# Patient Record
Sex: FEMALE | Race: WHITE | NOT HISPANIC OR LATINO | URBAN - METROPOLITAN AREA
[De-identification: names, ages, dates, MRNs, and addresses within clinical notes are randomized per-mention and may not be internally consistent; named-entity substitution may affect disease eponyms.]

---

## 2024-01-08 ENCOUNTER — TELEPHONE (OUTPATIENT)
Dept: PRIMARY CARE | Facility: CLINIC | Age: 64
End: 2024-01-08
Payer: COMMERCIAL

## 2024-01-08 NOTE — TELEPHONE ENCOUNTER
Pt has an appointment 1/31/24 and would like an order for Bw before appt. Please call pt once order in chart and will mail to her home address.

## 2024-01-24 DIAGNOSIS — K21.9 GASTROESOPHAGEAL REFLUX DISEASE WITHOUT ESOPHAGITIS: ICD-10-CM

## 2024-01-24 DIAGNOSIS — Z00.00 WELL ADULT HEALTH CHECK: ICD-10-CM

## 2024-01-24 DIAGNOSIS — E78.5 HYPERLIPIDEMIA, UNSPECIFIED HYPERLIPIDEMIA TYPE: ICD-10-CM

## 2024-01-24 DIAGNOSIS — Z00.00 HEALTHCARE MAINTENANCE: Primary | ICD-10-CM

## 2024-01-31 ENCOUNTER — APPOINTMENT (OUTPATIENT)
Dept: PRIMARY CARE | Facility: CLINIC | Age: 64
End: 2024-01-31
Payer: COMMERCIAL

## 2024-02-02 ENCOUNTER — OFFICE VISIT (OUTPATIENT)
Dept: PRIMARY CARE | Facility: CLINIC | Age: 64
End: 2024-02-02
Payer: COMMERCIAL

## 2024-02-02 VITALS
TEMPERATURE: 96.4 F | HEIGHT: 64 IN | WEIGHT: 148.6 LBS | DIASTOLIC BLOOD PRESSURE: 91 MMHG | BODY MASS INDEX: 25.37 KG/M2 | HEART RATE: 58 BPM | SYSTOLIC BLOOD PRESSURE: 155 MMHG

## 2024-02-02 DIAGNOSIS — K21.9 GASTROESOPHAGEAL REFLUX DISEASE WITHOUT ESOPHAGITIS: ICD-10-CM

## 2024-02-02 DIAGNOSIS — G47.00 INSOMNIA, UNSPECIFIED TYPE: ICD-10-CM

## 2024-02-02 DIAGNOSIS — I10 PRIMARY HYPERTENSION: ICD-10-CM

## 2024-02-02 DIAGNOSIS — Z00.00 WELL ADULT HEALTH CHECK: ICD-10-CM

## 2024-02-02 DIAGNOSIS — E78.2 MIXED HYPERLIPIDEMIA: Primary | ICD-10-CM

## 2024-02-02 PROBLEM — J30.9 ALLERGIC RHINITIS: Status: ACTIVE | Noted: 2024-02-02

## 2024-02-02 PROBLEM — E78.5 DYSLIPIDEMIA: Status: ACTIVE | Noted: 2024-02-02

## 2024-02-02 PROBLEM — M19.079 PRIMARY LOCALIZED OSTEOARTHROSIS OF ANKLE AND FOOT: Status: ACTIVE | Noted: 2017-05-03

## 2024-02-02 PROBLEM — K57.92 DIVERTICULITIS: Status: ACTIVE | Noted: 2023-03-20

## 2024-02-02 PROBLEM — E66.3 OVERWEIGHT: Status: ACTIVE | Noted: 2024-02-02

## 2024-02-02 PROBLEM — E78.5 HYPERLIPIDEMIA: Status: ACTIVE | Noted: 2024-02-02

## 2024-02-02 PROBLEM — K82.4 GALL BLADDER POLYP: Status: ACTIVE | Noted: 2024-02-02

## 2024-02-02 PROBLEM — J34.89 RHINORRHEA: Status: ACTIVE | Noted: 2024-02-02

## 2024-02-02 PROBLEM — S39.012A STRAIN OF MUSCLE, FASCIA AND TENDON OF LOWER BACK, INITIAL ENCOUNTER: Status: ACTIVE | Noted: 2021-09-20

## 2024-02-02 PROBLEM — T84.84XA PAINFUL ORTHOPAEDIC HARDWARE (CMS-HCC): Status: ACTIVE | Noted: 2023-03-08

## 2024-02-02 PROBLEM — K57.30 DIVERTICULA, COLON: Status: ACTIVE | Noted: 2024-02-02

## 2024-02-02 PROBLEM — K58.9 IRRITABLE BOWEL SYNDROME: Status: ACTIVE | Noted: 2023-03-20

## 2024-02-02 PROBLEM — Z98.1 H/O ARTHRODESIS: Status: ACTIVE | Noted: 2023-03-08

## 2024-02-02 PROBLEM — R73.9 HYPERGLYCEMIA: Status: ACTIVE | Noted: 2024-02-02

## 2024-02-02 PROBLEM — M47.816 LUMBAR SPONDYLOSIS: Status: ACTIVE | Noted: 2021-09-20

## 2024-02-02 PROCEDURE — 99214 OFFICE O/P EST MOD 30 MIN: CPT | Performed by: INTERNAL MEDICINE

## 2024-02-02 PROCEDURE — 3080F DIAST BP >= 90 MM HG: CPT | Performed by: INTERNAL MEDICINE

## 2024-02-02 PROCEDURE — 3077F SYST BP >= 140 MM HG: CPT | Performed by: INTERNAL MEDICINE

## 2024-02-02 PROCEDURE — 1036F TOBACCO NON-USER: CPT | Performed by: INTERNAL MEDICINE

## 2024-02-02 RX ORDER — TRAZODONE HYDROCHLORIDE 150 MG/1
150 TABLET ORAL NIGHTLY
Qty: 90 TABLET | Refills: 1 | Status: SHIPPED | OUTPATIENT
Start: 2024-02-02

## 2024-02-02 RX ORDER — MELOXICAM 15 MG/1
15 TABLET ORAL DAILY
COMMUNITY
Start: 2023-07-13 | End: 2024-02-02 | Stop reason: ALTCHOICE

## 2024-02-02 RX ORDER — OMEPRAZOLE 40 MG/1
40 CAPSULE, DELAYED RELEASE ORAL
Qty: 90 CAPSULE | Refills: 1 | Status: SHIPPED | OUTPATIENT
Start: 2024-02-02

## 2024-02-02 RX ORDER — TRAZODONE HYDROCHLORIDE 150 MG/1
150 TABLET ORAL NIGHTLY
COMMUNITY
End: 2024-02-02 | Stop reason: SDUPTHER

## 2024-02-02 RX ORDER — GEMFIBROZIL 600 MG/1
600 TABLET, FILM COATED ORAL 2 TIMES DAILY
Qty: 180 TABLET | Refills: 1 | Status: SHIPPED | OUTPATIENT
Start: 2024-02-02

## 2024-02-02 RX ORDER — ROSUVASTATIN CALCIUM 20 MG/1
20 TABLET, COATED ORAL DAILY
COMMUNITY
Start: 2016-09-22 | End: 2024-02-02 | Stop reason: SDUPTHER

## 2024-02-02 RX ORDER — GEMFIBROZIL 600 MG/1
600 TABLET, FILM COATED ORAL DAILY
COMMUNITY
Start: 2018-04-27 | End: 2024-02-02 | Stop reason: SDUPTHER

## 2024-02-02 RX ORDER — OMEPRAZOLE 40 MG/1
40 CAPSULE, DELAYED RELEASE ORAL
COMMUNITY
Start: 2016-06-23 | End: 2024-02-02 | Stop reason: SDUPTHER

## 2024-02-02 RX ORDER — ROSUVASTATIN CALCIUM 20 MG/1
20 TABLET, COATED ORAL DAILY
Qty: 90 TABLET | Refills: 1 | Status: SHIPPED | OUTPATIENT
Start: 2024-02-02

## 2024-02-02 RX ORDER — ATENOLOL 25 MG/1
25 TABLET ORAL DAILY
Qty: 90 TABLET | Refills: 1 | Status: SHIPPED | OUTPATIENT
Start: 2024-02-02 | End: 2024-07-31

## 2024-02-02 ASSESSMENT — PATIENT HEALTH QUESTIONNAIRE - PHQ9
2. FEELING DOWN, DEPRESSED OR HOPELESS: NOT AT ALL
1. LITTLE INTEREST OR PLEASURE IN DOING THINGS: NOT AT ALL
SUM OF ALL RESPONSES TO PHQ9 QUESTIONS 1 AND 2: 0

## 2024-02-02 NOTE — PROGRESS NOTES
Assessment/Plan   Problem List Items Addressed This Visit       GERD (gastroesophageal reflux disease)    Relevant Medications    omeprazole (PriLOSEC) 40 mg DR capsule    Hyperlipidemia - Primary    Relevant Medications    gemfibrozil (Lopid) 600 mg tablet    rosuvastatin (Crestor) 20 mg tablet    Other Relevant Orders    Lipid Panel    CK    Insomnia    Relevant Medications    traZODone (Desyrel) 150 mg tablet    Primary hypertension    Relevant Medications    atenolol (Tenormin) 25 mg tablet     Other Visit Diagnoses       Well adult health check        Relevant Orders    Comprehensive Metabolic Panel          Mixed hyperlipidemia increase the dose of gemfibrozil   Benign essential hypertension start small dose of atenolol to see how he does   She had some bradycardia as well but she does have some anxiety status to going for EGD and colonoscopy I thought atenolol will be better suited for her  We will reevaluate in 3-month with the labs as ordered  Advised to cut down the diet with a low carbohydrate which will improve the triglyceride remarkably  She has history of gallbladder polyp we will continue to monitor  Subjective   Patient ID: Ana Paula Villalobos is a 63 y.o. female who presents for Follow-up (Blood work results.  Requesting refills on medications. ).    Past Surgical History:   Procedure Laterality Date    MOUTH SURGERY  01/30/2024    OTHER SURGICAL HISTORY  05/11/2022    Colonoscopy    OTHER SURGICAL HISTORY  05/11/2022    Median nerve neuroplasty    OTHER SURGICAL HISTORY  05/11/2022    Foot surgery      No family history on file.   Social History     Socioeconomic History    Marital status:      Spouse name: Not on file    Number of children: Not on file    Years of education: Not on file    Highest education level: Not on file   Occupational History    Not on file   Tobacco Use    Smoking status: Former     Packs/day: 1     Types: Cigarettes     Quit date: 2014     Years since quitting: 10.0  "   Smokeless tobacco: Never   Substance and Sexual Activity    Alcohol use: Yes    Drug use: Never    Sexual activity: Not on file   Other Topics Concern    Not on file   Social History Narrative    Not on file     Social Determinants of Health     Financial Resource Strain: Not on file   Food Insecurity: Not on file   Transportation Needs: Not on file   Physical Activity: Not on file   Stress: Not on file   Social Connections: Not on file   Intimate Partner Violence: Not on file   Housing Stability: Not on file      Patient has no known allergies.   Current Outpatient Medications   Medication Sig Dispense Refill    atenolol (Tenormin) 25 mg tablet Take 1 tablet (25 mg) by mouth once daily. 90 tablet 1    gemfibrozil (Lopid) 600 mg tablet Take 1 tablet (600 mg) by mouth 2 times a day. 180 tablet 1    omeprazole (PriLOSEC) 40 mg DR capsule Take 1 capsule (40 mg) by mouth once daily in the morning. Take before meals. 90 capsule 1    rosuvastatin (Crestor) 20 mg tablet Take 1 tablet (20 mg) by mouth once daily. 90 tablet 1    traZODone (Desyrel) 150 mg tablet Take 1 tablet (150 mg) by mouth once daily at bedtime. 90 tablet 1     No current facility-administered medications for this visit.      Vitals:    02/02/24 1026   BP: (!) 155/91   BP Location: Right arm   Patient Position: Sitting   Pulse: 58   Temp: 35.8 °C (96.4 °F)   Weight: 67.4 kg (148 lb 9.6 oz)   Height: 1.626 m (5' 4\")      Problem List Items Addressed This Visit       GERD (gastroesophageal reflux disease)    Relevant Medications    omeprazole (PriLOSEC) 40 mg DR capsule    Hyperlipidemia - Primary    Relevant Medications    gemfibrozil (Lopid) 600 mg tablet    rosuvastatin (Crestor) 20 mg tablet    Other Relevant Orders    Lipid Panel    CK    Insomnia    Relevant Medications    traZODone (Desyrel) 150 mg tablet    Primary hypertension    Relevant Medications    atenolol (Tenormin) 25 mg tablet     Other Visit Diagnoses       Well adult health check     " "   Relevant Orders    Comprehensive Metabolic Panel           Orders Placed This Encounter   Procedures    Comprehensive Metabolic Panel     Standing Status:   Future     Standing Expiration Date:   2/2/2025     Order Specific Question:   Release result to MyChart     Answer:   Immediate    Lipid Panel     Standing Status:   Future     Standing Expiration Date:   2/2/2025     Order Specific Question:   Release result to MyChart     Answer:   Immediate    CK     Standing Status:   Future     Standing Expiration Date:   2/2/2025     Order Specific Question:   Release result to MyChart     Answer:   Immediate [1]        HPI  This very pleasant 63-year-old female presented for review follow-up and refills  Her lab has shown evidence of uncontrolled mixed hyperlipidemia and was discussing whether we should increase the dose of gemfibrozil which was agreed upon  Nevertheless risk associated with combination of Crestor and gemfibrozil was discussed  She also mentioning that multiple times her blood pressure has been elevated  She is also going for EGD and colonoscopy soon  And she wanted medication for insomnia refilled    ROS otherwise negative  Past medical history reviewed  Social and family history reviewed  Allergies and medications reviewed  Recent labs reviewed  Vital signs reviewed    PHYSICAL EXAM  Cardiovascular chest abdominal neurological examination normal  Labs in the clinic system was reviewed shared and discussed      No results found for: \"PR1\", \"BMPR1A\", \"CMPLAS\", \"LD3YFGPY\", \"KPSAT\"   No results found for: \"CHOL\", \"LDLCALC\", \"HDLC\", \"LCTRG\", \"CHHDL\"             "

## 2024-05-20 ENCOUNTER — APPOINTMENT (OUTPATIENT)
Dept: PRIMARY CARE | Facility: CLINIC | Age: 64
End: 2024-05-20
Payer: COMMERCIAL

## 2025-02-20 ENCOUNTER — APPOINTMENT (OUTPATIENT)
Dept: RADIOLOGY | Facility: CLINIC | Age: 65
End: 2025-02-20
Payer: COMMERCIAL

## 2025-03-07 ENCOUNTER — HOSPITAL ENCOUNTER (OUTPATIENT)
Dept: RADIOLOGY | Facility: CLINIC | Age: 65
Discharge: HOME | End: 2025-03-07
Payer: COMMERCIAL

## 2025-03-07 DIAGNOSIS — R91.8 OTHER NONSPECIFIC ABNORMAL FINDING OF LUNG FIELD: ICD-10-CM

## 2025-03-07 PROCEDURE — 71250 CT THORAX DX C-: CPT

## 2025-03-19 ENCOUNTER — APPOINTMENT (OUTPATIENT)
Dept: CARDIOLOGY | Facility: CLINIC | Age: 65
End: 2025-03-19
Payer: COMMERCIAL

## 2025-03-19 VITALS
HEIGHT: 64 IN | SYSTOLIC BLOOD PRESSURE: 110 MMHG | BODY MASS INDEX: 23.56 KG/M2 | WEIGHT: 138 LBS | HEART RATE: 62 BPM | DIASTOLIC BLOOD PRESSURE: 62 MMHG

## 2025-03-19 DIAGNOSIS — E78.5 DYSLIPIDEMIA: ICD-10-CM

## 2025-03-19 DIAGNOSIS — I25.10 CORONARY ARTERY CALCIFICATION: Primary | ICD-10-CM

## 2025-03-19 PROCEDURE — 99204 OFFICE O/P NEW MOD 45 MIN: CPT | Performed by: INTERNAL MEDICINE

## 2025-03-19 PROCEDURE — 93000 ELECTROCARDIOGRAM COMPLETE: CPT | Performed by: INTERNAL MEDICINE

## 2025-03-19 PROCEDURE — 3078F DIAST BP <80 MM HG: CPT | Performed by: INTERNAL MEDICINE

## 2025-03-19 PROCEDURE — 3074F SYST BP LT 130 MM HG: CPT | Performed by: INTERNAL MEDICINE

## 2025-03-19 PROCEDURE — 3008F BODY MASS INDEX DOCD: CPT | Performed by: INTERNAL MEDICINE

## 2025-03-19 PROCEDURE — 1036F TOBACCO NON-USER: CPT | Performed by: INTERNAL MEDICINE

## 2025-03-19 RX ORDER — BISMUTH SUBSALICYLATE 262 MG
1 TABLET,CHEWABLE ORAL DAILY
COMMUNITY

## 2025-03-19 NOTE — PATIENT INSTRUCTIONS
"It was my pleasure to meet you.  I look forward to being your cardiologist.  I am a huge believer in communicating with my patients.  Please contact me at any time, if anything is not clear to you regarding anything we have discussed, or if new questions occur to you.     You should increase your intake of fresh fruits and vegetables.  Try to consume 9-12 servings per day of such foods.  You should increase your intake of deep sea fish such as salmon and tuna.  Try to get two servings per week of fish, but if you are a pregnant woman, talk to your obstetrician before increasing your fish intake.  You should increase your intake of unprocessed nuts such as walnuts or almonds.  Increase your intake of plant-based protein.  You should avoid fried foods.  Don't consume sugary or starchy foods and sugary drinks.  Avoid saturated fats.  Try not to dine at restaurants more than once per month, and don't dine at fast food places.  Try to get 7-9 hours of sleep every night.  Try to get 150 minutes per week of moderate intensity exercise (after I have cleared you to start an exercise program).  Try to maintain the appropriate weight for your height based on body mass index (BMI). Maintain your cholesterol, blood sugar, and blood pressure in the recommended respective normal ranges.  There is a wealth of information on the American Heart Association's website regarding this.  Just Google \"Life's Essential 8\" for more information.   Ask me about any of these details  if you have questions.    As your cardiologist, I will be available to you at any time to answer any question you have concerning your heart health.  My staff, Zelda can also answer any questions you may have.  Best of luck.       It is important for us to have an accurate list of the medications, supplements, and their doses.  It is also important for us to have an accurate list of your allergies.  Please bring this information to every appointment.  " This is a vital part of the quality of care you receive through all of your providers.

## 2025-03-19 NOTE — PROGRESS NOTES
Referred by Dr. Jones for Please see below.     History Of Present Illness:    Ana Paula Villalobos is a 64 y.o. female presenting with coronary artery calcification.    PCP: Dr. Coral Arce ([facility])    I am seeing this 64-year-old former 15-pack-year smoker with hyperlipidemia, hypertriglyceridemia, and a family history of premature CAD (mother sustained an MI in her 70s; father developed CAD in his 70s) for evaluation of coronary artery calcification.    The patient gets an annual thoracic CT scan for follow-up of pulmonary nodules because of her prior history of tobacco abuse.  Her thoracic CT scan in February 2025 commented on the presence of severe coronary artery calcifrication, for which reason the patient was advised to see me.  The patient has had episodic left sided chest discomfort that occurs from time to time.  It feels like her bra is a bit tight.  These symptms are not exertinal.   The patient denies dyspnea, palpitations, orthopnea, PND, syncope, and near syncope.    She rides a stationary bike four tiems a week for 18 minutes.  She enjoys playing street hockey with her grandchildren.  She feels well when she exercises.      In February 2024, she fell, and was admitted to [facility].  A thoracic CT was done which commented on mild CAC.  She was referred to a cardiologist at the [facility].  She went on to have a stress test in February 2024 which was nondiagnostic due to a blunted heart rate response secondary to atenolol at a peak functional capacity of 8.3 METS.  Her PCP ordered a monitor  for bradycardia, done at [facility].  The patient's monitor study done January 21, 2025 disclosed rare PACs, rare PVCs, and average heart rate of 68, and no sustained pathologic arrhythmia..        Past Medical History:  She has a past medical history of Right upper quadrant pain (06/01/2022).    Past Surgical History:  She has a past surgical history that includes Other surgical history (05/11/2022);  "Other surgical history (05/11/2022); Other surgical history (05/11/2022); and Mouth surgery (01/30/2024).      Social History:  She reports that she quit smoking about 11 years ago. Her smoking use included cigarettes. She has never used smokeless tobacco. She reports that she does not currently use alcohol. She reports that she does not use drugs.    Family History:  Family History   Problem Relation Name Age of Onset    Other (CABG) Mother      Hyperlipidemia Mother      Coronary artery disease Mother      Other (pacemaker) Mother      Angina Mother      Other (CABG) Father      Heart failure Father      Coronary artery disease Father      Diabetes Father's Sister      Diabetes Father's Brother          Allergies:  Patient has no known allergies.    Outpatient Medications:  Current Outpatient Medications   Medication Instructions    HAIR, SKIN AND NAILS, BIOTIN, ORAL 1 capsule, Every other day    multivitamin tablet 1 tablet, Daily    omeprazole (PRILOSEC) 40 mg, oral, Daily before breakfast    rosuvastatin (CRESTOR) 20 mg, oral, Daily    traZODone (DESYREL) 150 mg, oral, Nightly        Last Recorded Vitals:  Vitals:    03/19/25 1300   BP: 110/62   BP Location: Left arm   Patient Position: Sitting   Pulse: 62   Weight: 62.6 kg (138 lb)   Height: 1.626 m (5' 4\")       Physical Exam:  GENERAL:  pleasant 64 year-old  HEENT: No xanthelasma  NECK: Supple, no palpable adenopathy or thyromegaly  CHEST: Clear to auscultation, respiratory effort unlabored  CARDIAC: RRR, normal S1 and S2, no audible murmur, rub, gallop, carotids are brisk, PMI is not displaced  ABD: Active bowel sounds, nontender, no organomegaly, no evidence of ascites  EXT: No clubbing, cyanosis, edema, or tenderness  NEURO: Awake, alert, appropriate, speech is fluent         Last Labs:  CBC -  No results found for: \"WBC\", \"HGB\", \"HCT\", \"MCV\", \"PLT\"    CMP -  No results found for: \"CALCIUM\", \"PHOS\", \"PROT\", \"ALBUMIN\", \"AST\", \"ALT\", \"ALKPHOS\", " "\"BILITOT\"    LIPID PANEL -   No results found for: \"CHOL\", \"TRIG\", \"HDL\", \"CHHDL\", \"LDLF\", \"VLDL\", \"NHDL\"    RENAL FUNCTION PANEL -   No results found for: \"GLUCOSE\", \"NA\", \"K\", \"CL\", \"CO2\", \"ANIONGAP\", \"BUN\", \"CREATININE\", \"GFRMALE\", \"CALCIUM\", \"PHOS\", \"ALBUMIN\"     Lab Results   Component Value Date    HGBA1C 5.8 (H) 11/19/2024         Lab review: I have Chemistry CMP: No results found for: \"ALBUMIN\", \"CALCIUM\", \"CO2\", \"CREATININE\", \"GLUCOSE\", \"BILITOT\", \"PROT\", \"ALT\", \"AST\", \"GGT\", \"ALKPHOS\", Chemistry BMP No results found for: \"GLUCOSE\", \"CALCIUM\", \"CO2\", \"CREATININE\", CBC:No results found for: \"WBC\", \"RBC\", \"HGB\", \"HCT\", \"MCV\", \"MCH\", \"MCHC\", \"RDW\", \"RDWCV\", \"RDWSD\", \"MPV\", \"NRBC\", and Lipids: No results found for: \"CHOL\", \"CHLPL\", \"HDL\", \"LDLCALC\", \"TRIG\"  Diagnostic review: I have personally reviewed the result(s) of the Holter Monitor and exercise stress test.  Please see the HPI for complete details.    Assessment/Plan   Assessment & Plan  Dyslipidemia  Risk factor modification: educational materials were provided to the patient.     Orders:    ECG 12 lead (Clinic Performed)    Follow Up In Cardiology; Future    Coronary artery calcification    Orders:    Stress Test; Future    Follow Up In Cardiology; Future    This hyperlipidemic former 15-pack-year smoker has symptoms of atypical chest pain and \"severe coronary artery calcification\" on her thoracic CT scan from February 2025.  A prior treadmill stress test in February 2024 was nondiagnostic because of a blunted heart rate response to exercise while on a beta-blocker.  Her monitor study in January 2025 was benign with no significant pathologic arrhythmia.  We discussed the implications of her history as they relate to short-term and long-term cardiac risk.  To address her short-term cardiac risk in the context of her symptoms of chest discomfort, our approach:    Exercise stress test.  She has a normal resting EKG and is no longer on a beta-blocker that " might blunt her heart rate response to exercise.    In regards to addressing her long-term risk of having a cardiac event, I discussed with the patient that our usual approach to addressing this question is to obtain a coronary artery calcium score.  Rather than subject her to the risks of radiation once again with a calcium score dedicated CT scan, I suggested that her primary care physician order this as an add-on to her usual annual thoracic CT scan next year.  At that time, with the results of her calcium score and hand, we can prognosticate on her long-term risk of having a cardiac event.        Govind Jones MD

## 2025-03-24 ENCOUNTER — OFFICE VISIT (OUTPATIENT)
Dept: PRIMARY CARE | Facility: CLINIC | Age: 65
End: 2025-03-24
Payer: COMMERCIAL

## 2025-03-24 VITALS
DIASTOLIC BLOOD PRESSURE: 78 MMHG | TEMPERATURE: 97.3 F | BODY MASS INDEX: 23.56 KG/M2 | HEIGHT: 64 IN | SYSTOLIC BLOOD PRESSURE: 113 MMHG | WEIGHT: 138 LBS | HEART RATE: 56 BPM

## 2025-03-24 DIAGNOSIS — Z87.891 FORMER SMOKER: ICD-10-CM

## 2025-03-24 DIAGNOSIS — J20.9 ACUTE BRONCHITIS, UNSPECIFIED ORGANISM: ICD-10-CM

## 2025-03-24 DIAGNOSIS — R06.02 SHORTNESS OF BREATH: ICD-10-CM

## 2025-03-24 PROCEDURE — 3078F DIAST BP <80 MM HG: CPT | Performed by: FAMILY MEDICINE

## 2025-03-24 PROCEDURE — 1036F TOBACCO NON-USER: CPT | Performed by: FAMILY MEDICINE

## 2025-03-24 PROCEDURE — 3008F BODY MASS INDEX DOCD: CPT | Performed by: FAMILY MEDICINE

## 2025-03-24 PROCEDURE — 99213 OFFICE O/P EST LOW 20 MIN: CPT | Performed by: FAMILY MEDICINE

## 2025-03-24 PROCEDURE — 3074F SYST BP LT 130 MM HG: CPT | Performed by: FAMILY MEDICINE

## 2025-03-24 RX ORDER — AMOXICILLIN 500 MG/1
500 TABLET, FILM COATED ORAL EVERY 8 HOURS SCHEDULED
COMMUNITY

## 2025-03-24 RX ORDER — AMOXICILLIN AND CLAVULANATE POTASSIUM 875; 125 MG/1; MG/1
875 TABLET, FILM COATED ORAL 2 TIMES DAILY
Qty: 20 TABLET | Refills: 0 | Status: SHIPPED | OUTPATIENT
Start: 2025-03-24 | End: 2025-04-03

## 2025-03-24 ASSESSMENT — ENCOUNTER SYMPTOMS
HEMATOLOGIC/LYMPHATIC NEGATIVE: 1
ALLERGIC/IMMUNOLOGIC NEGATIVE: 1
CARDIOVASCULAR NEGATIVE: 1
WHEEZING: 1
ENDOCRINE NEGATIVE: 1
NEUROLOGICAL NEGATIVE: 1
GASTROINTESTINAL NEGATIVE: 1
CONSTITUTIONAL NEGATIVE: 1
PSYCHIATRIC NEGATIVE: 1
COUGH: 1
MUSCULOSKELETAL NEGATIVE: 1
EYES NEGATIVE: 1

## 2025-03-24 ASSESSMENT — PATIENT HEALTH QUESTIONNAIRE - PHQ9
1. LITTLE INTEREST OR PLEASURE IN DOING THINGS: NOT AT ALL
SUM OF ALL RESPONSES TO PHQ9 QUESTIONS 1 AND 2: 0
2. FEELING DOWN, DEPRESSED OR HOPELESS: NOT AT ALL

## 2025-03-24 NOTE — PROGRESS NOTES
Subjective Ana Paula is a 64-year-old female who is switching back to our practice with complaints of cough for 1 week.  She has felt some slight wheezing.  The cough seems to be worse at nighttime.  It has been mostly dry.  She denies any nasal congestion sore throat or fever.  Patient denies any known history of asthma or COPD.  She quit smoking totally about 11 years ago.  She has had some slight shortness of breath.  She has taken about 2 to 3 days of leftover amoxicillin 500 mg daily.  Her past medical history is as noted.  Her medications are as noted as well.    Patient ID: Ana Paula Villalobos is a 64 y.o. female who presents for Sick Visit (Bad cough):    Problem List Items Addressed This Visit    None  Visit Diagnoses       Acute bronchitis, unspecified organism        Shortness of breath        BMI 23.0-23.9, adult        Former smoker               Past Medical History:   Diagnosis Date    Right upper quadrant pain 06/01/2022    RUQ pain      Past Surgical History:   Procedure Laterality Date    MOUTH SURGERY  01/30/2024    OTHER SURGICAL HISTORY  05/11/2022    Colonoscopy    OTHER SURGICAL HISTORY  05/11/2022    Median nerve neuroplasty    OTHER SURGICAL HISTORY  05/11/2022    Foot surgery      Family History   Problem Relation Name Age of Onset    Other (CABG) Mother      Hyperlipidemia Mother      Coronary artery disease Mother      Other (pacemaker) Mother      Angina Mother      Other (CABG) Father      Heart failure Father      Coronary artery disease Father      Diabetes Father's Sister      Diabetes Father's Brother        Social History     Socioeconomic History    Marital status:      Spouse name: Not on file    Number of children: Not on file    Years of education: Not on file    Highest education level: Not on file   Occupational History    Not on file   Tobacco Use    Smoking status: Former     Current packs/day: 0.00     Types: Cigarettes     Quit date: 2014     Years since quitting:  11.2    Smokeless tobacco: Never   Substance and Sexual Activity    Alcohol use: Not Currently    Drug use: Never    Sexual activity: Defer   Other Topics Concern    Not on file   Social History Narrative    Not on file     Social Drivers of Health     Financial Resource Strain: Low Risk  (2/16/2024)    Received from [facility]    Overall Financial Resource Strain (CARDIA)     Difficulty of Paying Living Expenses: Not hard at all   Food Insecurity: No Food Insecurity (2/16/2024)    Received from [facility]    Hunger Vital Sign     Worried About Running Out of Food in the Last Year: Never true     Ran Out of Food in the Last Year: Never true   Transportation Needs: No Transportation Needs (2/16/2024)    Received from [facility]    PRAPARE - Transportation     Lack of Transportation (Medical): No     Lack of Transportation (Non-Medical): No   Physical Activity: Inactive (2/16/2024)    Received from [facility]    Exercise Vital Sign     Days of Exercise per Week: 0 days     Minutes of Exercise per Session: 0 min   Stress: No Stress Concern Present (2/16/2024)    Received from [facility]    [employer] - Occupational Stress Questionnaire     Feeling of Stress : Only a little   Social Connections: Moderately Isolated (2/16/2024)    Received from [facility]    Social Connection and Isolation Panel [NHANES]     Frequency of Communication with Friends and Family: More than three times a week     Frequency of Social Gatherings with Friends and Family: More than three times a week     Attends Congregational Services: Never     Active Member of Clubs or Organizations: No     Attends Club or Organization Meetings: Never     Marital Status:    Intimate Partner Violence: Not on file   Housing Stability: Low Risk  (2/16/2024)    Received from [facility]    Housing Stability Vital Sign     Unable to Pay for Housing in the Last Year: No     Number of Places Lived in the Last Year: 1     Unstable Housing in the Last Year: No      Patient has no known allergies.   Current Outpatient Medications   Medication Sig Dispense Refill    amoxicillin (Amoxil) 500 mg tablet Take 1 tablet (500 mg) by mouth every 8 hours.      HAIR, SKIN AND NAILS, BIOTIN, ORAL Take 1 capsule by mouth every other day. - As directed.      multivitamin tablet Take 1 tablet by mouth once daily.      omeprazole (PriLOSEC) 40 mg DR capsule Take 1 capsule (40 mg) by mouth once daily in the morning. Take before meals. 90 capsule 1    rosuvastatin (Crestor) 20 mg tablet Take 1 tablet (20 mg) by mouth once daily. (Patient taking differently: Take 2 tablets (40 mg) by mouth once daily.) 90 tablet 1    traZODone (Desyrel) 150 mg tablet Take 1 tablet (150 mg) by mouth once daily at bedtime. 90 tablet 1     No current facility-administered medications for this visit.       Immunization History   Administered Date(s) Administered    COVID-19, mRNA, LNP-S, PF, 30 mcg/0.3 mL dose 03/22/2021, 04/12/2021    Pfizer Purple Cap SARS-CoV-2 11/11/2021        Review of Systems   Constitutional: Negative.    HENT: Negative.     Eyes: Negative.    Respiratory:  Positive for cough and wheezing.    Cardiovascular: Negative.    Gastrointestinal: Negative.    Endocrine: Negative.    Genitourinary: Negative.    Musculoskeletal: Negative.    Skin: Negative.    Allergic/Immunologic: Negative.    Neurological: Negative.    Hematological: Negative.    Psychiatric/Behavioral: Negative.     All other systems reviewed and are negative.       Vitals:    03/24/25 1337   BP: 113/78   Pulse: 56   Temp: 36.3 °C (97.3 °F)     Vitals:    03/24/25 1337   Weight: 62.6 kg (138 lb)      Physical Exam  Constitutional:       General: She is not in acute distress.     Appearance: Normal appearance.   Cardiovascular:       Rate and Rhythm: Normal rate and regular rhythm.      Pulses: Normal pulses.      Heart sounds: Normal heart sounds.   Pulmonary:      Effort: Pulmonary effort is normal. No respiratory distress.      Breath sounds: Wheezing (Very mild end expiratory wheezes bilaterally.  No rales) present. No rales.   Neurological:      General: No focal deficit present.      Mental Status: She is alert and oriented to person, place, and time. Mental status is at baseline.          ASSESSMENT/PLAN: Bronchitis.  Begin Augmentin 875 mg twice daily for 10 days.  Discontinue amoxicillin.  Begin Mucinex dm twice daily.  Increase fluids.  Patient has a exercise stress test scheduled in 2 days.  We discussed this and the need to cancel the stress test if her coughing has not improved at that point.  Call in 4 to 5 days if any cough persists    Continue current meds as noted    Follow-up as scheduled and call as needed

## 2025-03-24 NOTE — PROGRESS NOTES
Subjective     Patient ID: Ana Paula Villalobos is a 64 y.o. female who presents for Sick Visit (Bad cough):    Problem List Items Addressed This Visit    None     Past Medical History:   Diagnosis Date    Right upper quadrant pain 2022    RUQ pain      Past Surgical History:   Procedure Laterality Date    MOUTH SURGERY  2024    OTHER SURGICAL HISTORY  2022    Colonoscopy    OTHER SURGICAL HISTORY  2022    Median nerve neuroplasty    OTHER SURGICAL HISTORY  2022    Foot surgery      Family History   Problem Relation Name Age of Onset    Other (CABG) Mother      Hyperlipidemia Mother      Coronary artery disease Mother      Other (pacemaker) Mother      Angina Mother      Other (CABG) Father      Heart failure Father      Coronary artery disease Father      Diabetes Father's Sister      Diabetes Father's Brother        Social History     Socioeconomic History    Marital status:      Spouse name: Not on file    Number of children: Not on file    Years of education: Not on file    Highest education level: Not on file   Occupational History    Not on file   Tobacco Use    Smoking status: Former     Current packs/day: 0.00     Types: Cigarettes     Quit date:      Years since quittin.2    Smokeless tobacco: Never   Substance and Sexual Activity    Alcohol use: Not Currently    Drug use: Never    Sexual activity: Defer   Other Topics Concern    Not on file   Social History Narrative    Not on file     Social Drivers of Health     Financial Resource Strain: Low Risk  (2024)    Received from [facility]    Overall Financial Resource Strain (CARDIA)     Difficulty of Paying Living Expenses: Not hard at all   Food Insecurity: No Food Insecurity (2024)    Received from [facility]    Hunger Vital Sign     Worried About Running Out of Food in the Last Year: Never true     Ran Out of Food in the Last Year: Never true    Transportation Needs: No Transportation Needs (2/16/2024)    Received from [facility]    PRAPARE - Transportation     Lack of Transportation (Medical): No     Lack of Transportation (Non-Medical): No   Physical Activity: Inactive (2/16/2024)    Received from [facility]    Exercise Vital Sign     Days of Exercise per Week: 0 days     Minutes of Exercise per Session: 0 min   Stress: No Stress Concern Present (2/16/2024)    Received from [facility]    [employer] - Occupational Stress Questionnaire     Feeling of Stress : Only a little   Social Connections: Moderately Isolated (2/16/2024)    Received from [facility]    Social Connection and Isolation Panel [NHANES]     Frequency of Communication with Friends and Family: More than three times a week     Frequency of Social Gatherings with Friends and Family: More than three times a week     Attends Gnosticism Services: Never     Active Member of Clubs or Organizations: No     Attends Club or Organization Meetings: Never     Marital Status:    Intimate Partner Violence: Not on file   Housing Stability: Low Risk  (2/16/2024)    Received from [facility]    Housing Stability Vital Sign     Unable to Pay for Housing in the Last Year: No     Number of Places Lived in the Last Year: 1     Unstable Housing in the Last Year: No      Patient has no known allergies.   Current Outpatient Medications   Medication Sig Dispense Refill    amoxicillin (Amoxil) 500 mg tablet Take 1 tablet (500 mg) by mouth every 8 hours.      HAIR, SKIN AND NAILS, BIOTIN, ORAL Take 1 capsule by mouth every other day. - As directed.      multivitamin tablet Take 1 tablet by mouth once daily.      omeprazole (PriLOSEC) 40 mg DR capsule Take 1 capsule (40 mg) by mouth once daily in the morning. Take before meals. 90 capsule 1    rosuvastatin (Crestor)  20 mg tablet Take 1 tablet (20 mg) by mouth once daily. (Patient taking differently: Take 2 tablets (40 mg) by mouth once daily.) 90 tablet 1    traZODone (Desyrel) 150 mg tablet Take 1 tablet (150 mg) by mouth once daily at bedtime. 90 tablet 1     No current facility-administered medications for this visit.       Immunization History   Administered Date(s) Administered    COVID-19, mRNA, LNP-S, PF, 30 mcg/0.3 mL dose 03/22/2021, 04/12/2021    Pfizer Purple Cap SARS-CoV-2 11/11/2021        Review of Systems     Vitals:    03/24/25 1337   BP: 113/78   Pulse: 56   Temp: 36.3 °C (97.3 °F)     Vitals:    03/24/25 1337   Weight: 62.6 kg (138 lb)      Physical Exam     ASSESSMENT/PLAN:       Scribe Attestation  By signing my name below, I, Monse Morris LPN, Scribe   attest that this documentation has been prepared under the direction and in the presence of Gary Vazquez MD.

## 2025-03-26 ENCOUNTER — HOSPITAL ENCOUNTER (OUTPATIENT)
Dept: CARDIOLOGY | Facility: HOSPITAL | Age: 65
Discharge: HOME | End: 2025-03-26
Payer: COMMERCIAL

## 2025-03-26 DIAGNOSIS — I25.10 CORONARY ARTERY CALCIFICATION: ICD-10-CM

## 2025-03-26 PROCEDURE — 93016 CV STRESS TEST SUPVJ ONLY: CPT | Performed by: INTERNAL MEDICINE

## 2025-03-26 PROCEDURE — 93018 CV STRESS TEST I&R ONLY: CPT | Performed by: INTERNAL MEDICINE

## 2025-03-26 PROCEDURE — 93017 CV STRESS TEST TRACING ONLY: CPT

## 2025-04-21 ENCOUNTER — APPOINTMENT (OUTPATIENT)
Dept: CARDIOLOGY | Facility: CLINIC | Age: 65
End: 2025-04-21
Payer: COMMERCIAL

## 2025-04-21 VITALS
WEIGHT: 140 LBS | HEART RATE: 56 BPM | HEIGHT: 64 IN | DIASTOLIC BLOOD PRESSURE: 76 MMHG | BODY MASS INDEX: 23.9 KG/M2 | SYSTOLIC BLOOD PRESSURE: 126 MMHG

## 2025-04-21 DIAGNOSIS — I45.89 CHRONOTROPIC INCOMPETENCE: ICD-10-CM

## 2025-04-21 DIAGNOSIS — I25.10 CORONARY ARTERY CALCIFICATION: ICD-10-CM

## 2025-04-21 DIAGNOSIS — R07.89 CHEST TIGHTNESS: Primary | ICD-10-CM

## 2025-04-21 DIAGNOSIS — E78.49 OTHER HYPERLIPIDEMIA: ICD-10-CM

## 2025-04-21 DIAGNOSIS — E78.5 DYSLIPIDEMIA: ICD-10-CM

## 2025-04-21 PROCEDURE — 1036F TOBACCO NON-USER: CPT | Performed by: INTERNAL MEDICINE

## 2025-04-21 PROCEDURE — 3074F SYST BP LT 130 MM HG: CPT | Performed by: INTERNAL MEDICINE

## 2025-04-21 PROCEDURE — 3078F DIAST BP <80 MM HG: CPT | Performed by: INTERNAL MEDICINE

## 2025-04-21 PROCEDURE — 99215 OFFICE O/P EST HI 40 MIN: CPT | Performed by: INTERNAL MEDICINE

## 2025-04-21 PROCEDURE — 3008F BODY MASS INDEX DOCD: CPT | Performed by: INTERNAL MEDICINE

## 2025-04-21 NOTE — PATIENT INSTRUCTIONS

## 2025-04-21 NOTE — ASSESSMENT & PLAN NOTE
She had a nondiagnostic treadmill stress test, only able to reach 72% of her age-predicted maximal heart rate on the treadmill study, on the basis of which we will proceed with pharmacologic nuclear stress testing and follow-up thereafter.  Orders:    Nuclear Stress Test; Future    Follow Up In Cardiology; Future

## 2025-04-21 NOTE — PROGRESS NOTES
"Chief Complaint:   Please see below.     History Of Present Illness:    Ana Paula Villalobos is a 64 y.o. female presenting with coronary artery calcification, chest discomfort, chronotropic incompetence.    This 64-year-old former 15-pack-year smoker with hyperlipidemia, returns to the office in follow-up for evaluation of coronary artery calcification, and bradycardia.     The patient gets an annual thoracic CT scan for follow-up of pulmonary nodules because of her prior history of tobacco abuse.  Her thoracic CT scan in February 2025 commented on the presence of \"severe coronary artery calcifrication\", for which reason the patient was advised to see me.  The patient has had episodic left sided chest discomfort that occurs from time to time.  It feels like her bra is a bit tight.  These symptms are not exertinal.   The patient denies dyspnea, palpitations, orthopnea, PND, syncope, and near syncope.    She rides a stationary bike four tiems a week for 18 minutes.  She enjoys playing street hockey with her grandchildren.  She feels well when she exercises.       In February 2024, she fell, and was admitted to [facility].  A thoracic CT was done which commented on \"mild coronary artery calcification\".  She was referred to a cardiologist at the [facility].  She went on to have a stress test in February 2024 which was nondiagnostic due to a blunted heart rate response secondary to atenolol at a peak functional capacity of 8.3 METS.  Her PCP ordered a monitor  for bradycardia, done at [facility].  The patient's monitor study done January 21, 2025 disclosed rare PACs, rare PVCs, and average heart rate of 68, and no sustained pathologic arrhythmia.    At her previous visit in March 2025, she reported episodic discomfort in her chest on the basis of which, I ordered a stress test.  The patient's stress test on 3/26/2025 disclosed no ischemia at 10.1 METS.  Her peak heart rate at peak exercise was 113 (72% of APMHR), on the basis of " "which the test was nondiagnostic.  Heart rate response to exercise was consistent with chronotropic incompetence.    She continues to experience episodic chest tightness, with the last episode occurring perhaps 2 weeks ago.  There is no radiation of discomfort to the neck, jaw, arms, or elsewhere.  She did not have associated symptoms such as dyspnea or diaphoresis.  She has not had syncope or near syncope.  She has observed that she feels fatigued more often than usual, and this is most noticeable in the evening when she is sitting and relaxing at home.                Last Recorded Vitals:  Vitals:    04/21/25 0900   BP: 126/76   BP Location: Left arm   Patient Position: Sitting   Pulse: 56   Weight: 63.5 kg (140 lb)   Height: 1.626 m (5' 4\")       Past Medical History:  She has a past medical history of Right upper quadrant pain (06/01/2022).    Past Surgical History:  She has a past surgical history that includes Other surgical history (05/11/2022); Other surgical history (05/11/2022); Other surgical history (05/11/2022); and Mouth surgery (01/30/2024).      Social History:  She reports that she quit smoking about 11 years ago. Her smoking use included cigarettes. She has never used smokeless tobacco. She reports that she does not currently use alcohol. She reports that she does not use drugs.    Family History:  Family History[1]     Allergies:  Patient has no known allergies.    Outpatient Medications:  Current Outpatient Medications   Medication Instructions    HAIR, SKIN AND NAILS, BIOTIN, ORAL 1 capsule, Every other day    multivitamin tablet 1 tablet, Daily    omeprazole (PRILOSEC) 40 mg, oral, Daily before breakfast    rosuvastatin (CRESTOR) 20 mg, oral, Daily    traZODone (DESYREL) 150 mg, oral, Nightly       Physical Exam:  GENERAL:  pleasant 64 year-old  HEENT: No xanthelasma  NECK: Supple, no palpable adenopathy or thyromegaly  CHEST: Clear to auscultation, respiratory effort unlabored  CARDIAC: RRR, " "normal S1 and S2, no audible murmur, rub, gallop, carotids are brisk, PMI is not displaced  ABD: Active bowel sounds, nontender, no organomegaly, no evidence of ascites  EXT: No clubbing, cyanosis, edema, or tenderness  NEURO: Awake, alert, appropriate, speech is fluent       Last Labs:  CBC -  No results found for: \"WBC\", \"HGB\", \"HCT\", \"MCV\", \"PLT\"    CMP -  No results found for: \"CALCIUM\", \"PHOS\", \"PROT\", \"ALBUMIN\", \"AST\", \"ALT\", \"ALKPHOS\", \"BILITOT\"    LIPID PANEL -   No results found for: \"CHOL\", \"TRIG\", \"HDL\", \"CHHDL\", \"LDLF\", \"VLDL\", \"NHDL\"    RENAL FUNCTION PANEL -   No results found for: \"GLUCOSE\", \"NA\", \"K\", \"CL\", \"CO2\", \"ANIONGAP\", \"BUN\", \"CREATININE\", \"GFRMALE\", \"CALCIUM\", \"PHOS\", \"ALBUMIN\"     Lab Results   Component Value Date    HGBA1C 5.8 (H) 11/19/2024         Diagnostic review: I have independently interpreted the stress test.  My findings are as summarized in the HPI.    Assessment/Plan   Assessment & Plan  Dyslipidemia  Risk factor modification: educational materials were provided to the patient.     Orders:    Follow Up In Cardiology    Follow Up In Cardiology; Future    Coronary artery calcification    Orders:    Follow Up In Cardiology    Follow Up In Cardiology; Future    Chest tightness  She had a nondiagnostic treadmill stress test, only able to reach 72% of her age-predicted maximal heart rate on the treadmill study, on the basis of which we will proceed with pharmacologic nuclear stress testing and follow-up thereafter.  Orders:    Nuclear Stress Test; Future    Follow Up In Cardiology; Future    Chronotropic incompetence    Orders:    Follow Up In Cardiology; Future    Referral to Cardiac Electrophysiology; Future    Other hyperlipidemia               Govind Jones MD         [1]   Family History  Problem Relation Name Age of Onset    Other (CABG) Mother      Hyperlipidemia Mother      Coronary artery disease Mother      Other (pacemaker) Mother      Angina Mother      Other (CABG) " Father      Heart failure Father      Coronary artery disease Father      Diabetes Father's Sister      Diabetes Father's Brother

## 2025-05-02 ENCOUNTER — APPOINTMENT (OUTPATIENT)
Dept: CARDIOLOGY | Facility: HOSPITAL | Age: 65
End: 2025-05-02
Payer: COMMERCIAL

## 2025-05-02 ENCOUNTER — HOSPITAL ENCOUNTER (OUTPATIENT)
Dept: RADIOLOGY | Facility: HOSPITAL | Age: 65
End: 2025-05-02
Payer: COMMERCIAL

## 2025-05-02 ENCOUNTER — HOSPITAL ENCOUNTER (OUTPATIENT)
Dept: CARDIOLOGY | Facility: HOSPITAL | Age: 65
Discharge: HOME | End: 2025-05-02
Payer: COMMERCIAL

## 2025-05-02 ENCOUNTER — HOSPITAL ENCOUNTER (OUTPATIENT)
Dept: RADIOLOGY | Facility: HOSPITAL | Age: 65
Discharge: HOME | End: 2025-05-02
Payer: COMMERCIAL

## 2025-05-02 ENCOUNTER — APPOINTMENT (OUTPATIENT)
Dept: RADIOLOGY | Facility: HOSPITAL | Age: 65
End: 2025-05-02
Payer: COMMERCIAL

## 2025-05-02 DIAGNOSIS — R07.89 CHEST TIGHTNESS: ICD-10-CM

## 2025-05-02 PROCEDURE — 93018 CV STRESS TEST I&R ONLY: CPT | Performed by: INTERNAL MEDICINE

## 2025-05-02 PROCEDURE — 78452 HT MUSCLE IMAGE SPECT MULT: CPT

## 2025-05-02 PROCEDURE — 2500000004 HC RX 250 GENERAL PHARMACY W/ HCPCS (ALT 636 FOR OP/ED): Mod: JZ | Performed by: NURSE PRACTITIONER

## 2025-05-02 PROCEDURE — 3430000001 HC RX 343 DIAGNOSTIC RADIOPHARMACEUTICALS: Performed by: INTERNAL MEDICINE

## 2025-05-02 PROCEDURE — 93016 CV STRESS TEST SUPVJ ONLY: CPT | Performed by: INTERNAL MEDICINE

## 2025-05-02 PROCEDURE — 93017 CV STRESS TEST TRACING ONLY: CPT

## 2025-05-02 PROCEDURE — A9502 TC99M TETROFOSMIN: HCPCS | Performed by: INTERNAL MEDICINE

## 2025-05-02 RX ORDER — REGADENOSON 0.08 MG/ML
0.4 INJECTION, SOLUTION INTRAVENOUS ONCE
Status: COMPLETED | OUTPATIENT
Start: 2025-05-02 | End: 2025-05-02

## 2025-05-02 RX ADMIN — REGADENOSON 0.4 MG: 0.08 INJECTION, SOLUTION INTRAVENOUS at 11:07

## 2025-05-02 RX ADMIN — TETROFOSMIN 36 MILLICURIE: 0.23 INJECTION, POWDER, LYOPHILIZED, FOR SOLUTION INTRAVENOUS at 11:10

## 2025-05-02 RX ADMIN — TETROFOSMIN 10.2 MILLICURIE: 0.23 INJECTION, POWDER, LYOPHILIZED, FOR SOLUTION INTRAVENOUS at 09:55

## 2025-05-21 ENCOUNTER — APPOINTMENT (OUTPATIENT)
Dept: CARDIOLOGY | Facility: CLINIC | Age: 65
End: 2025-05-21
Payer: COMMERCIAL

## 2025-05-21 VITALS
HEART RATE: 56 BPM | DIASTOLIC BLOOD PRESSURE: 72 MMHG | BODY MASS INDEX: 23.22 KG/M2 | WEIGHT: 136 LBS | SYSTOLIC BLOOD PRESSURE: 110 MMHG | HEIGHT: 64 IN

## 2025-05-21 DIAGNOSIS — E78.5 DYSLIPIDEMIA: ICD-10-CM

## 2025-05-21 DIAGNOSIS — I25.10 CORONARY ARTERY CALCIFICATION: ICD-10-CM

## 2025-05-21 DIAGNOSIS — I25.10 CORONARY ARTERY DISEASE INVOLVING NATIVE CORONARY ARTERY OF NATIVE HEART WITHOUT ANGINA PECTORIS: Primary | ICD-10-CM

## 2025-05-21 DIAGNOSIS — I45.89 CHRONOTROPIC INCOMPETENCE: ICD-10-CM

## 2025-05-21 DIAGNOSIS — R07.89 CHEST TIGHTNESS: ICD-10-CM

## 2025-05-21 PROCEDURE — 3008F BODY MASS INDEX DOCD: CPT | Performed by: INTERNAL MEDICINE

## 2025-05-21 PROCEDURE — 3074F SYST BP LT 130 MM HG: CPT | Performed by: INTERNAL MEDICINE

## 2025-05-21 PROCEDURE — 99214 OFFICE O/P EST MOD 30 MIN: CPT | Performed by: INTERNAL MEDICINE

## 2025-05-21 PROCEDURE — 1036F TOBACCO NON-USER: CPT | Performed by: INTERNAL MEDICINE

## 2025-05-21 PROCEDURE — 3078F DIAST BP <80 MM HG: CPT | Performed by: INTERNAL MEDICINE

## 2025-05-21 RX ORDER — NAPROXEN SODIUM 220 MG/1
81 TABLET, FILM COATED ORAL DAILY
Qty: 30 TABLET | Refills: 11 | Status: SHIPPED | OUTPATIENT
Start: 2025-05-21 | End: 2026-05-21

## 2025-05-21 NOTE — PROGRESS NOTES
"Chief Complaint:   Please see below.     History Of Present Illness:    Ana Paula iVllalobos is a 64 y.o. female presenting with de facto CAD, palpitations, chronotropic incompetence.    This 64-year-old former 15-pack-year smoker with hyperlipidemia and de facto CAD returns to the office in follow up.  Please see my prior notes from 3/19 and 4/21/2025 for complete details.   At a previous visit in March 2025, she reported episodic discomfort in her chest on the basis of which, I ordered a stress test.  The patient's stress test on 3/26/2025 disclosed no ischemia at 10.1 METS.  Her peak heart rate at peak exercise was 113 (72% of APMHR), on the basis of which the test was nondiagnostic.  Heart rate response to exercise was consistent with chronotropic incompetence.  The patient's SPECT in early May 2025 disclosed no ischemia with an EF of 61%.    Her chest tightness is resolved since her last visit.  She experiences palpitations every morning when she gets up in the morning to go to the bathroom.  This feels like a pounding in her chest lasting for less than 10 seconds.  The patient denies chest discomfort, dyspnea,  orthopnea, PND, syncope, and near syncope.         Last Recorded Vitals:  Vitals:    05/21/25 1100   BP: 110/72   BP Location: Right arm   Patient Position: Sitting   Pulse: 56   Weight: 61.7 kg (136 lb)   Height: 1.626 m (5' 4\")       Past Medical History:  She has a past medical history of Right upper quadrant pain (06/01/2022).    Past Surgical History:  She has a past surgical history that includes Other surgical history (05/11/2022); Other surgical history (05/11/2022); Other surgical history (05/11/2022); and Mouth surgery (01/30/2024).      Social History:  She reports that she quit smoking about 11 years ago. Her smoking use included cigarettes. She has never used smokeless tobacco. She reports that she does not currently use alcohol. She reports that she does not use drugs.    Family " "History:  Family History[1]     Allergies:  Patient has no known allergies.    Outpatient Medications:  Current Outpatient Medications   Medication Instructions    HAIR, SKIN AND NAILS, BIOTIN, ORAL 1 capsule, Every other day    multivitamin tablet 1 tablet, Daily    omeprazole (PRILOSEC) 40 mg, oral, Daily before breakfast    rosuvastatin (CRESTOR) 20 mg, oral, Daily    traZODone (DESYREL) 150 mg, oral, Nightly       Physical Exam:  GENERAL:  pleasant 64 year-old  HEENT: No xanthelasma  NECK: Supple, no palpable adenopathy or thyromegaly  CHEST: Clear to auscultation, respiratory effort unlabored  CARDIAC: RRR, normal S1 and S2, no audible murmur, rub, gallop, carotids are brisk, PMI is not displaced  ABD: Active bowel sounds, nontender, no organomegaly, no evidence of ascites  EXT: No clubbing, cyanosis, edema, or tenderness  NEURO: Awake, alert, appropriate, speech is fluent       Last Labs:  CBC -  No results found for: \"WBC\", \"HGB\", \"HCT\", \"MCV\", \"PLT\"    CMP -  No results found for: \"CALCIUM\", \"PHOS\", \"PROT\", \"ALBUMIN\", \"AST\", \"ALT\", \"ALKPHOS\", \"BILITOT\"    LIPID PANEL -   No results found for: \"CHOL\", \"TRIG\", \"HDL\", \"CHHDL\", \"LDLF\", \"VLDL\", \"NHDL\"    RENAL FUNCTION PANEL -   No results found for: \"GLUCOSE\", \"NA\", \"K\", \"CL\", \"CO2\", \"ANIONGAP\", \"BUN\", \"CREATININE\", \"GFRMALE\", \"CALCIUM\", \"PHOS\", \"ALBUMIN\"     Lab Results   Component Value Date    HGBA1C 5.8 (H) 11/19/2024         Diagnostic review: I have independently interpreted the SPECT .  My findings are  as summarized in the HPI.  .    Assessment/Plan   Assessment & Plan  Dyslipidemia  Risk factor modification: educational materials were provided to the patient.     Orders:    Follow Up In Cardiology    Follow Up In Cardiology; Future    Coronary artery calcification  CAD: The patient has stable, functional class I CAD, and is doing well.  No additional testing is necessary at present. Important aspects of lifestyle modification were discussed in detail " with the patient.  Recent labs and testing have been reviewed.    Orders:    Follow Up In Cardiology    aspirin 81 mg chewable tablet; Chew 1 tablet (81 mg) once daily.    Follow Up In Cardiology; Future    Chest tightness    Orders:    Follow Up In Cardiology    Follow Up In Cardiology; Future    Chronotropic incompetence  Follow up with EP as scheduled.    Orders:    Follow Up In Cardiology    Follow Up In Cardiology; Future    Coronary artery disease involving native coronary artery of native heart without angina pectoris               Govind Jones MD         [1]   Family History  Problem Relation Name Age of Onset    Other (CABG) Mother      Hyperlipidemia Mother      Coronary artery disease Mother      Other (pacemaker) Mother      Angina Mother      Other (CABG) Father      Heart failure Father      Coronary artery disease Father      Diabetes Father's Sister      Diabetes Father's Brother

## 2025-05-21 NOTE — ASSESSMENT & PLAN NOTE
Follow up with EP as scheduled.    Orders:    Follow Up In Cardiology    Follow Up In Cardiology; Future

## 2025-05-21 NOTE — ASSESSMENT & PLAN NOTE
CAD: The patient has stable, functional class I CAD, and is doing well.  No additional testing is necessary at present. Important aspects of lifestyle modification were discussed in detail with the patient.  Recent labs and testing have been reviewed.    Orders:    Follow Up In Cardiology    aspirin 81 mg chewable tablet; Chew 1 tablet (81 mg) once daily.    Follow Up In Cardiology; Future

## 2025-10-02 ENCOUNTER — APPOINTMENT (OUTPATIENT)
Dept: CARDIOLOGY | Facility: CLINIC | Age: 65
End: 2025-10-02
Payer: COMMERCIAL

## 2026-05-26 ENCOUNTER — APPOINTMENT (OUTPATIENT)
Dept: CARDIOLOGY | Facility: CLINIC | Age: 66
End: 2026-05-26
Payer: COMMERCIAL